# Patient Record
Sex: FEMALE | Race: WHITE | NOT HISPANIC OR LATINO | Employment: FULL TIME | URBAN - METROPOLITAN AREA
[De-identification: names, ages, dates, MRNs, and addresses within clinical notes are randomized per-mention and may not be internally consistent; named-entity substitution may affect disease eponyms.]

---

## 2017-04-04 ENCOUNTER — GENERIC CONVERSION - ENCOUNTER (OUTPATIENT)
Dept: OTHER | Facility: OTHER | Age: 62
End: 2017-04-04

## 2018-01-14 NOTE — MISCELLANEOUS
Message   Recorded as Task   Date: 04/04/2017 02:46 PM, Created By: Benny Perales   Task Name: Call Back   Assigned To: Zander Andrew   Regarding Patient: Reena Johnston, Status: Active   Comment:    Benny Perales - 04 Apr 2017 2:46 PM     TASK CREATED  Ptient has an appointment scheduled in May,2017 Patient feeling fitigue  [id number] with pt  Lost lab slip and requested to have mailed to her  Placed in mail  Active Problems    1  Abnormal mammogram (793 80) (R92 8)   2  Anemia (285 9) (D64 9)   3  Bone pain (733 90) (M89 8X9)   4  Decreased hearing (389 9) (H91 90)   5  Depression with anxiety (300 4) (F41 8)   6  Encounter for screening colonoscopy (V76 51) (Z12 11)   7  Fatigue (780 79) (R53 83)   8  Height loss (781 91) (R29 890)   9  PHILIP (iron deficiency anemia) (280 9) (D50 9)   10  Need for influenza vaccination (V04 81) (Z23)   11  Need for Tdap vaccination (V06 1) (Z23)   12  Postmenopausal (V49 81) (Z78 0)   13  Screening for breast cancer (V76 10) (Z12 39)   14  Screening mammogram for high-risk patient (V76 11) (Z12 31)   15  Vitamin D deficiency (268 9) (E55 9)    Current Meds   1  LamoTRIgine 100 MG Oral Tablet; Therapy: (Recorded:30Uxg9766) to Recorded   2  Omeprazole 20 MG Oral Capsule Delayed Release; Therapy: (Recorded:86Vgg0433) to Recorded   3  PROzac 20 MG Oral Capsule (FLUoxetine HCl) Recorded   4  Vitamin D (Ergocalciferol) 34229 UNIT Oral Capsule; TAKE 1 CAPSULE BY MOUTH   EVERY WEEK; Therapy: [id number] to (Deborra Mins)  Requested for: 61HUF8838; Last   Rx:06Mar2015 Ordered   5  ZyPREXA TABS (OLANZapine); Therapy: (Recorded:39Dsv7086) to Recorded    Allergies    1  No Known Drug Allergies    Plan  PHILIP (iron deficiency anemia)    · (1) CBC/PLT/DIFF; Status:Active; Requested for:04Apr2017;    · (1) FERRITIN; Status:Active;  Requested for:04Apr2017;     Signatures   Electronically signed by : Rakesh Roland RN; Apr 4 2017  3:14PM EST (Author)